# Patient Record
Sex: MALE | Race: WHITE | NOT HISPANIC OR LATINO
[De-identification: names, ages, dates, MRNs, and addresses within clinical notes are randomized per-mention and may not be internally consistent; named-entity substitution may affect disease eponyms.]

---

## 2020-06-18 DIAGNOSIS — Z01.818 ENCOUNTER FOR OTHER PREPROCEDURAL EXAMINATION: ICD-10-CM

## 2020-06-18 PROBLEM — Z00.00 ENCOUNTER FOR PREVENTIVE HEALTH EXAMINATION: Status: ACTIVE | Noted: 2020-06-18

## 2020-06-19 ENCOUNTER — APPOINTMENT (OUTPATIENT)
Dept: DISASTER EMERGENCY | Facility: CLINIC | Age: 45
End: 2020-06-19

## 2020-06-20 LAB — SARS-COV-2 N GENE NPH QL NAA+PROBE: NOT DETECTED

## 2020-06-22 ENCOUNTER — RESULT REVIEW (OUTPATIENT)
Age: 45
End: 2020-06-22

## 2020-06-22 ENCOUNTER — OUTPATIENT (OUTPATIENT)
Dept: OUTPATIENT SERVICES | Facility: HOSPITAL | Age: 45
LOS: 1 days | End: 2020-06-22
Payer: COMMERCIAL

## 2020-06-22 DIAGNOSIS — K62.5 HEMORRHAGE OF ANUS AND RECTUM: ICD-10-CM

## 2020-06-22 PROCEDURE — 43239 EGD BIOPSY SINGLE/MULTIPLE: CPT

## 2020-06-22 PROCEDURE — 88305 TISSUE EXAM BY PATHOLOGIST: CPT

## 2020-06-22 PROCEDURE — 88305 TISSUE EXAM BY PATHOLOGIST: CPT | Mod: 26

## 2020-06-22 NOTE — PROGRESS NOTE ADULT - SUBJECTIVE AND OBJECTIVE BOX
Colonoscopy Report  Indication: Rectal bleeding   Referring: Dr. Jerardo Ramirez   Instrument:  [id number]  Anesthesia: MAC  Consent:  Informed consent was obtained from the patient after providing any opportunity for questions  Procedure: After placing the patient in the left lateral decubitus position, the colonoscope was gently inserted into the rectum and advanced to the cecum. Color, texture, mucosa, and anatomy of the colon were carefully examined with the scope. The patient tolerated the procedure well. After completion of the exam, the patient was transferred to the recovery room.     Preparation:  Findings:   Anal Canal	Normal  Rectum	Punctate mucosal erythema (Bxed), Large Internal hemorrhoids  Sigmoid Colon 	Few Diverticuli  Descending Colon	Normal  Splenic Flexure	Normal  Transverse Colon	Normal  Hepatic Flexure	Normal  Ascending Colon	 Normal  Cecum	Normal  Ileo-cecal Valve	Normal  Ileum 	Not intubated     EBL:0    Impression:   1. R/o proctitis  2. Rare diverticulosis  3. Internal hemorrhoids    Plan:  1. Follow up path  2. High fiber diet  3. Sitz bath  4. Proctozol 2.5% cream apply to anal area BID PRN          Attending:       Jerardo Ramirez M.D.

## 2020-06-24 ENCOUNTER — OUTPATIENT (OUTPATIENT)
Dept: OUTPATIENT SERVICES | Facility: HOSPITAL | Age: 45
LOS: 1 days | End: 2020-06-24
Payer: COMMERCIAL

## 2020-06-24 ENCOUNTER — RESULT REVIEW (OUTPATIENT)
Age: 45
End: 2020-06-24

## 2020-06-24 DIAGNOSIS — K92.2 GASTROINTESTINAL HEMORRHAGE, UNSPECIFIED: ICD-10-CM

## 2020-06-24 PROCEDURE — 88305 TISSUE EXAM BY PATHOLOGIST: CPT

## 2020-06-24 PROCEDURE — 88305 TISSUE EXAM BY PATHOLOGIST: CPT | Mod: 26

## 2020-06-24 PROCEDURE — 88312 SPECIAL STAINS GROUP 1: CPT

## 2020-06-24 PROCEDURE — 88312 SPECIAL STAINS GROUP 1: CPT | Mod: 26

## 2020-06-24 PROCEDURE — 43239 EGD BIOPSY SINGLE/MULTIPLE: CPT

## 2020-06-24 PROCEDURE — 45380 COLONOSCOPY AND BIOPSY: CPT

## 2020-06-24 NOTE — PROGRESS NOTE ADULT - SUBJECTIVE AND OBJECTIVE BOX
Esophagogastroduodenoscopy Report  Indication: Abdominal pain, dyspepsia and iron deficiency  Referring MD: Jerardo Ramirez MD  Instrument:  # [id number]  Anesthesia: MAC  Consent:  Informed consent was obtained from the patient after providing any opportunity for questions  Procedure: The gastroscope was gently passed through the incisoral orifice into the oral cavity and under direct visualization the esophagus was intubated. The endoscope was passed down the esophagus, through the stomach and into proximal jejunum. Color, texture, mucosa and anatomy of the esophagus, stomach, and duodenum were carefully examined with the scope. The patient tolerated the procedure well. After completion of the examination, the patient was transferred to the recovery room.   Preparation: NPO   Findings:   Oropharynx	Normal  Esophagus	Normal  EG-junction	Normal  Cardia	Hiatal hernia (medium size)  Body	10mm Liner ulcer (no bleeding)   Antrum	Normal  Pylorus	Patchy mucosal erythema   Duodenal Bulb	Punctate mucosal erythema   2nd portion	Normal  3rd portion	Normal.     EBL:0    Impression:   1. Gastric ulcer  2. Gastritis  3. Duodenitis  4. Hiatal hernia    Plan:  1. Follow up path  2. Omeprazole 40mg daily  3. Avoid NSAID  4. Iron supplement  5. Anti reflux measure  6. Treat for H. pylori if positive       Procedure Start Time: 10:08am  Procedure End Time:  10:12am      Attending:     Jerardo Ramirez M.D.

## 2020-06-26 LAB — SURGICAL PATHOLOGY STUDY: SIGNIFICANT CHANGE UP
